# Patient Record
Sex: FEMALE | Race: WHITE
[De-identification: names, ages, dates, MRNs, and addresses within clinical notes are randomized per-mention and may not be internally consistent; named-entity substitution may affect disease eponyms.]

---
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
## 2021-11-01 ENCOUNTER — HOSPITAL ENCOUNTER (EMERGENCY)
Dept: HOSPITAL 75 - ER | Age: 21
LOS: 1 days | Discharge: HOME | End: 2021-11-02
Payer: SELF-PAY

## 2021-11-01 VITALS — HEIGHT: 62.99 IN | WEIGHT: 141.98 LBS | BODY MASS INDEX: 25.16 KG/M2
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
## 2021-11-01 DIAGNOSIS — S83.91XA: Primary | ICD-10-CM
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
## 2021-11-01 PROCEDURE — 73564 X-RAY EXAM KNEE 4 OR MORE: CPT

## 2021-11-02 VITALS — DIASTOLIC BLOOD PRESSURE: 76 MMHG | SYSTOLIC BLOOD PRESSURE: 132 MMHG
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
## 2021-11-02 NOTE — DIAGNOSTIC IMAGING REPORT
KNEE, RIGHT, 4 VIEWS OR >



COMPARISON: None available. 



INDICATION: Right knee pain



TECHNIQUE: Non-weight bearing AP, oblique, sunrise and lateral

views of the right knee knee. 



FINDINGS:

No fracture or traumatic malalignment.  The joint spaces are well

maintained.  No knee joint effusion.



IMPRESSION: Negative right knee radiographs.



Dictated by: 



  Dictated on workstation # [id number]

## 2021-11-25 ENCOUNTER — HOSPITAL ENCOUNTER (EMERGENCY)
Dept: HOSPITAL 75 - ER | Age: 21
Discharge: HOME | End: 2021-11-25
Payer: COMMERCIAL

## 2021-11-25 VITALS — SYSTOLIC BLOOD PRESSURE: 108 MMHG | DIASTOLIC BLOOD PRESSURE: 69 MMHG

## 2021-11-25 VITALS — WEIGHT: 141.98 LBS | HEIGHT: 62.99 IN | BODY MASS INDEX: 25.16 KG/M2

## 2021-11-25 DIAGNOSIS — M25.561: ICD-10-CM

## 2021-11-25 DIAGNOSIS — M25.551: ICD-10-CM

## 2021-11-25 DIAGNOSIS — M25.562: Primary | ICD-10-CM

## 2021-11-25 LAB
ALBUMIN SERPL-MCNC: 4 GM/DL (ref 3.2–4.5)
ALP SERPL-CCNC: 33 U/L (ref 40–136)
ALT SERPL-CCNC: 10 U/L (ref 0–55)
APTT BLD: 29 SEC (ref 24–35)
APTT PPP: YELLOW S
BACTERIA #/AREA URNS HPF: NEGATIVE /HPF
BARBITURATES UR QL: NEGATIVE
BASOPHILS # BLD AUTO: 0 10^3/UL (ref 0–0.1)
BASOPHILS NFR BLD AUTO: 0 % (ref 0–10)
BENZODIAZ UR QL SCN: NEGATIVE
BILIRUB SERPL-MCNC: 0.4 MG/DL (ref 0.1–1)
BILIRUB UR QL STRIP: NEGATIVE
BUN/CREAT SERPL: 16
CALCIUM SERPL-MCNC: 8.8 MG/DL (ref 8.5–10.1)
CHLORIDE SERPL-SCNC: 106 MMOL/L (ref 98–107)
CO2 SERPL-SCNC: 20 MMOL/L (ref 21–32)
COCAINE UR QL: NEGATIVE
CREAT SERPL-MCNC: 0.7 MG/DL (ref 0.6–1.3)
EOSINOPHIL # BLD AUTO: 0.2 10^3/UL (ref 0–0.3)
EOSINOPHIL NFR BLD AUTO: 2 % (ref 0–10)
FIBRINOGEN PPP-MCNC: CLEAR MG/DL
GFR SERPLBLD BASED ON 1.73 SQ M-ARVRAT: 107 ML/MIN
GLUCOSE SERPL-MCNC: 127 MG/DL (ref 70–105)
GLUCOSE UR STRIP-MCNC: NEGATIVE MG/DL
HCT VFR BLD CALC: 38 % (ref 35–52)
HGB BLD-MCNC: 12.6 G/DL (ref 11.5–16)
INR PPP: 0.9 (ref 0.8–1.4)
KETONES UR QL STRIP: NEGATIVE
LEUKOCYTE ESTERASE UR QL STRIP: NEGATIVE
LYMPHOCYTES # BLD AUTO: 1.9 10^3/UL (ref 1–4)
LYMPHOCYTES NFR BLD AUTO: 26 % (ref 12–44)
MANUAL DIFFERENTIAL PERFORMED BLD QL: NO
MCH RBC QN AUTO: 29 PG (ref 25–34)
MCHC RBC AUTO-ENTMCNC: 33 G/DL (ref 32–36)
MCV RBC AUTO: 87 FL (ref 80–99)
METHADONE UR QL SCN: NEGATIVE
METHAMPHETAMINE SCREEN URINE S: NEGATIVE
MONOCYTES # BLD AUTO: 0.4 10^3/UL (ref 0–1)
MONOCYTES NFR BLD AUTO: 6 % (ref 0–12)
NEUTROPHILS # BLD AUTO: 4.9 10^3/UL (ref 1.8–7.8)
NEUTROPHILS NFR BLD AUTO: 66 % (ref 42–75)
NITRITE UR QL STRIP: NEGATIVE
OPIATES UR QL SCN: NEGATIVE
OXYCODONE UR QL: NEGATIVE
PH UR STRIP: 6.5 [PH] (ref 5–9)
PLATELET # BLD: 266 10^3/UL (ref 130–400)
PMV BLD AUTO: 8.9 FL (ref 9–12.2)
POTASSIUM SERPL-SCNC: 3.7 MMOL/L (ref 3.6–5)
PROPOXYPH UR QL: NEGATIVE
PROT SERPL-MCNC: 7 GM/DL (ref 6.4–8.2)
PROT UR QL STRIP: NEGATIVE
PROTHROMBIN TIME: 12.3 SEC (ref 12.2–14.7)
RBC #/AREA URNS HPF: (no result) /HPF
SODIUM SERPL-SCNC: 138 MMOL/L (ref 135–145)
SP GR UR STRIP: <=1.005 (ref 1.02–1.02)
SQUAMOUS #/AREA URNS HPF: (no result) /HPF
TRICYCLICS UR QL SCN: NEGATIVE
WBC # BLD AUTO: 7.4 10^3/UL (ref 4.3–11)
WBC #/AREA URNS HPF: (no result) /HPF

## 2021-11-25 PROCEDURE — 71260 CT THORAX DX C+: CPT

## 2021-11-25 PROCEDURE — 70450 CT HEAD/BRAIN W/O DYE: CPT

## 2021-11-25 PROCEDURE — 99284 EMERGENCY DEPT VISIT MOD MDM: CPT

## 2021-11-25 PROCEDURE — 80306 DRUG TEST PRSMV INSTRMNT: CPT

## 2021-11-25 PROCEDURE — 74177 CT ABD & PELVIS W/CONTRAST: CPT

## 2021-11-25 PROCEDURE — 85025 COMPLETE CBC W/AUTO DIFF WBC: CPT

## 2021-11-25 PROCEDURE — 36415 COLL VENOUS BLD VENIPUNCTURE: CPT

## 2021-11-25 PROCEDURE — 81000 URINALYSIS NONAUTO W/SCOPE: CPT

## 2021-11-25 PROCEDURE — 93041 RHYTHM ECG TRACING: CPT

## 2021-11-25 PROCEDURE — 80320 DRUG SCREEN QUANTALCOHOLS: CPT

## 2021-11-25 PROCEDURE — 73562 X-RAY EXAM OF KNEE 3: CPT

## 2021-11-25 PROCEDURE — 72170 X-RAY EXAM OF PELVIS: CPT

## 2021-11-25 PROCEDURE — 71045 X-RAY EXAM CHEST 1 VIEW: CPT

## 2021-11-25 PROCEDURE — 72125 CT NECK SPINE W/O DYE: CPT

## 2021-11-25 PROCEDURE — 73552 X-RAY EXAM OF FEMUR 2/>: CPT

## 2021-11-25 PROCEDURE — 85730 THROMBOPLASTIN TIME PARTIAL: CPT

## 2021-11-25 PROCEDURE — 85610 PROTHROMBIN TIME: CPT

## 2021-11-25 PROCEDURE — 80053 COMPREHEN METABOLIC PANEL: CPT

## 2021-11-25 PROCEDURE — 84703 CHORIONIC GONADOTROPIN ASSAY: CPT
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
## 2023-01-21 ENCOUNTER — HOSPITAL ENCOUNTER (OUTPATIENT)
Dept: HOSPITAL 75 - ER | Age: 23
Setting detail: OBSERVATION
LOS: 1 days | Discharge: HOME | End: 2023-01-22
Attending: OBSTETRICS & GYNECOLOGY | Admitting: OBSTETRICS & GYNECOLOGY
Payer: SELF-PAY

## 2023-01-21 DIAGNOSIS — O04.6: Primary | ICD-10-CM

## 2023-01-21 LAB
ALBUMIN SERPL-MCNC: 3.6 GM/DL (ref 3.2–4.5)
ALP SERPL-CCNC: 35 U/L (ref 40–136)
ALT SERPL-CCNC: 20 U/L (ref 0–55)
APTT BLD: 27 SEC (ref 24–35)
BASOPHILS # BLD AUTO: 0 10^3/UL (ref 0–0.1)
BASOPHILS NFR BLD AUTO: 0 % (ref 0–10)
BILIRUB SERPL-MCNC: 0.2 MG/DL (ref 0.1–1)
BUN/CREAT SERPL: 10
CALCIUM SERPL-MCNC: 8.3 MG/DL (ref 8.5–10.1)
CHLORIDE SERPL-SCNC: 105 MMOL/L (ref 98–107)
CO2 SERPL-SCNC: 20 MMOL/L (ref 21–32)
CREAT SERPL-MCNC: 0.69 MG/DL (ref 0.6–1.3)
EOSINOPHIL # BLD AUTO: 0.2 10^3/UL (ref 0–0.3)
EOSINOPHIL NFR BLD AUTO: 1 % (ref 0–10)
GFR SERPLBLD BASED ON 1.73 SQ M-ARVRAT: 127 ML/MIN
GLUCOSE SERPL-MCNC: 124 MG/DL (ref 70–105)
HCT VFR BLD CALC: 35 % (ref 35–52)
HGB BLD-MCNC: 11.9 G/DL (ref 11.5–16)
INR PPP: 0.9 (ref 0.8–1.4)
LYMPHOCYTES # BLD AUTO: 1.8 10^3/UL (ref 1–4)
LYMPHOCYTES NFR BLD AUTO: 14 % (ref 12–44)
MANUAL DIFFERENTIAL PERFORMED BLD QL: NO
MCH RBC QN AUTO: 29 PG (ref 25–34)
MCHC RBC AUTO-ENTMCNC: 34 G/DL (ref 32–36)
MCV RBC AUTO: 85 FL (ref 80–99)
MONOCYTES # BLD AUTO: 0.5 10^3/UL (ref 0–1)
MONOCYTES NFR BLD AUTO: 4 % (ref 0–12)
NEUTROPHILS # BLD AUTO: 10.3 10^3/UL (ref 1.8–7.8)
NEUTROPHILS NFR BLD AUTO: 81 % (ref 42–75)
PLATELET # BLD: 273 10^3/UL (ref 130–400)
PMV BLD AUTO: 9.3 FL (ref 9–12.2)
POTASSIUM SERPL-SCNC: 3.5 MMOL/L (ref 3.6–5)
PROT SERPL-MCNC: 6.3 GM/DL (ref 6.4–8.2)
PROTHROMBIN TIME: 13 SEC (ref 12.2–14.7)
SODIUM SERPL-SCNC: 134 MMOL/L (ref 135–145)
WBC # BLD AUTO: 12.7 10^3/UL (ref 4.3–11)

## 2023-01-21 PROCEDURE — 99283 EMERGENCY DEPT VISIT LOW MDM: CPT

## 2023-01-21 PROCEDURE — 36415 COLL VENOUS BLD VENIPUNCTURE: CPT

## 2023-01-21 PROCEDURE — 85610 PROTHROMBIN TIME: CPT

## 2023-01-21 PROCEDURE — 86900 BLOOD TYPING SEROLOGIC ABO: CPT

## 2023-01-21 PROCEDURE — 86920 COMPATIBILITY TEST SPIN: CPT

## 2023-01-21 PROCEDURE — 85025 COMPLETE CBC W/AUTO DIFF WBC: CPT

## 2023-01-21 PROCEDURE — 86850 RBC ANTIBODY SCREEN: CPT

## 2023-01-21 PROCEDURE — 85730 THROMBOPLASTIN TIME PARTIAL: CPT

## 2023-01-21 PROCEDURE — 96361 HYDRATE IV INFUSION ADD-ON: CPT

## 2023-01-21 PROCEDURE — 76817 TRANSVAGINAL US OBSTETRIC: CPT

## 2023-01-21 PROCEDURE — 86901 BLOOD TYPING SEROLOGIC RH(D): CPT

## 2023-01-21 PROCEDURE — 84702 CHORIONIC GONADOTROPIN TEST: CPT

## 2023-01-21 PROCEDURE — 96375 TX/PRO/DX INJ NEW DRUG ADDON: CPT

## 2023-01-21 PROCEDURE — 80053 COMPREHEN METABOLIC PANEL: CPT

## 2023-01-22 VITALS — DIASTOLIC BLOOD PRESSURE: 66 MMHG | SYSTOLIC BLOOD PRESSURE: 94 MMHG

## 2023-01-22 VITALS — DIASTOLIC BLOOD PRESSURE: 56 MMHG | SYSTOLIC BLOOD PRESSURE: 91 MMHG

## 2023-01-22 VITALS — DIASTOLIC BLOOD PRESSURE: 56 MMHG | SYSTOLIC BLOOD PRESSURE: 104 MMHG

## 2023-01-22 VITALS — SYSTOLIC BLOOD PRESSURE: 90 MMHG | DIASTOLIC BLOOD PRESSURE: 57 MMHG

## 2023-01-22 VITALS — SYSTOLIC BLOOD PRESSURE: 90 MMHG | DIASTOLIC BLOOD PRESSURE: 58 MMHG

## 2023-01-22 VITALS — DIASTOLIC BLOOD PRESSURE: 54 MMHG | SYSTOLIC BLOOD PRESSURE: 93 MMHG

## 2023-01-22 VITALS — DIASTOLIC BLOOD PRESSURE: 52 MMHG | SYSTOLIC BLOOD PRESSURE: 94 MMHG

## 2023-01-22 VITALS — SYSTOLIC BLOOD PRESSURE: 96 MMHG | DIASTOLIC BLOOD PRESSURE: 44 MMHG

## 2023-01-22 VITALS — DIASTOLIC BLOOD PRESSURE: 57 MMHG | SYSTOLIC BLOOD PRESSURE: 103 MMHG

## 2023-01-22 VITALS — SYSTOLIC BLOOD PRESSURE: 98 MMHG | DIASTOLIC BLOOD PRESSURE: 81 MMHG

## 2023-01-22 VITALS — SYSTOLIC BLOOD PRESSURE: 96 MMHG | DIASTOLIC BLOOD PRESSURE: 55 MMHG

## 2023-01-22 VITALS — SYSTOLIC BLOOD PRESSURE: 91 MMHG | DIASTOLIC BLOOD PRESSURE: 53 MMHG

## 2023-01-22 LAB
BASOPHILS # BLD AUTO: 0 10^3/UL (ref 0–0.1)
BASOPHILS NFR BLD AUTO: 0 % (ref 0–10)
EOSINOPHIL # BLD AUTO: 0.1 10^3/UL (ref 0–0.3)
EOSINOPHIL NFR BLD AUTO: 1 % (ref 0–10)
HCT VFR BLD CALC: 32 % (ref 35–52)
HGB BLD-MCNC: 11 G/DL (ref 11.5–16)
LYMPHOCYTES # BLD AUTO: 1.9 10^3/UL (ref 1–4)
LYMPHOCYTES NFR BLD AUTO: 17 % (ref 12–44)
MANUAL DIFFERENTIAL PERFORMED BLD QL: NO
MCH RBC QN AUTO: 29 PG (ref 25–34)
MCHC RBC AUTO-ENTMCNC: 34 G/DL (ref 32–36)
MCV RBC AUTO: 84 FL (ref 80–99)
MONOCYTES # BLD AUTO: 0.5 10^3/UL (ref 0–1)
MONOCYTES NFR BLD AUTO: 5 % (ref 0–12)
NEUTROPHILS # BLD AUTO: 8.6 10^3/UL (ref 1.8–7.8)
NEUTROPHILS NFR BLD AUTO: 77 % (ref 42–75)
PLATELET # BLD: 215 10^3/UL (ref 130–400)
PMV BLD AUTO: 9.5 FL (ref 9–12.2)
WBC # BLD AUTO: 11.1 10^3/UL (ref 4.3–11)

## 2023-01-22 NOTE — HISTORY & PHYSICAL
History and Physical


Date Seen by Provider:  2023


Time Seen by Provider:  10:58


This patient is a 22-year-old  1 female who was admitted via the 

emergency department last evening for vaginal hemorrhage due to induced 

miscarriage.  Patient reports that she conceived while using the NuvaRing.  The 

assumption was that she was around 8 weeks pregnant.  Apparently there was no 

confirmation of that fact.  Patient presented to the emergency department with 

low blood pressure and apparently there was concern for hypovolemic shock.  I 

was consulted by phone by Dr. Baum for recommendations for management of her 

bleeding.  Report to me was that her blood pressure initially was undetectable. 

However since patient was apparently recovering in the short period of time 

between presentation and this phone call from the emergency department which Dr. Baum relayed to me was about 17 minutes.  He reported that she was bleeding 

fairly briskly however no lab work was available, no ultrasound was available, 

no pelvic exam had been performed, I was unable to offer any specific management

recommendation for her bleeding other than obtain ultrasound to evaluate the 

status and condition of the uterus/pregnancy and of course routine lab work for 

baseline hemoglobin.  At that time Dr. Baum indicated that he would obtain 

those studies and then call me back.





After several hours I did receive another call from the emergency department 

patient at that point was bleeding apparently like a heavy period.  She had been

given or was given a single unit of packed red cells.  Her initial quantitative 

hCG was 170,000.  Her initial hemoglobin was 11.6.  As she was hemodynamically 

stable by this point plan was for admission and observation.





This morning the patient reports having bleeding like a period.  She no longer 

complaining of cramps pain or pressure.  She is lucid.  She has hungry and has 

been allowed a diet as she has had no significant bleeding since admission to [facility].  She is requesting discharge home.





Patient reports no allergies





Patient reports no medications other than NuvaRing that she was using at the 

time of conception


Patient did not obtain  inducing drugs by some means.  She had taken 

those medications to induce the miscarriage/.





Surgical history is none





Family history is noncontributory





Social history patient denies drugs tobacco or alcohol use





HEENT exam is normal


Neck is supple no lymphadenopathy no thyromegaly


Abdomen soft nontender nondistended


Extremities show no clubbing or cyanosis.  There is no Homans' sign.





Pelvic exam is deferred





Lab work is as follows


Laboratory Tests








Test


 23


23:00 23


05:24 Range/Units


 


 


White Blood Count


 12.7 H


 11.1 H


 4.3-11.0


10^3/uL


 


Red Blood Count


 4.09 


 3.82 


 3.80-5.11


10^6/uL


 


Hemoglobin 11.9  11.0 L 11.5-16.0  g/dL


 


Hematocrit 35  32 L 35-52  %


 


Mean Corpuscular Volume 85  84  80-99  fL


 


Mean Corpuscular Hemoglobin 29  29  25-34  pg


 


Mean Corpuscular Hemoglobin


Concent 34 


 34 


 32-36  g/dL





 


Red Cell Distribution Width 12.4  13.2  10.0-14.5  %


 


Platelet Count


 273 


 215 


 130-400


10^3/uL


 


Mean Platelet Volume 9.3  9.5  9.0-12.2  fL


 


Immature Granulocyte % (Auto) 0  0   %


 


Neutrophils (%) (Auto) 81 H 77 H 42-75  %


 


Lymphocytes (%) (Auto) 14  17  12-44  %


 


Monocytes (%) (Auto) 4  5  0-12  %


 


Eosinophils (%) (Auto) 1  1  0-10  %


 


Basophils (%) (Auto) 0  0  0-10  %


 


Neutrophils # (Auto)


 10.3 H


 8.6 H


 1.8-7.8


10^3/uL


 


Lymphocytes # (Auto)


 1.8 


 1.9 


 1.0-4.0


10^3/uL


 


Monocytes # (Auto)


 0.5 


 0.5 


 0.0-1.0


10^3/uL


 


Eosinophils # (Auto)


 0.2 


 0.1 


 0.0-0.3


10^3/uL


 


Basophils # (Auto)


 0.0 


 0.0 


 0.0-0.1


10^3/uL


 


Immature Granulocyte # (Auto)


 0.1 


 0.0 


 0.0-0.1


10^3/uL


 


Prothrombin Time 13.0   12.2-14.7  SEC


 


INR Comment 0.9   0.8-1.4  


 


Activated Partial


Thromboplast Time 27 


 


 24-35  SEC





 


Sodium Level 134 L  135-145  MMOL/L


 


Potassium Level 3.5 L  3.6-5.0  MMOL/L


 


Chloride Level 105     MMOL/L


 


Carbon Dioxide Level 20 L  21-32  MMOL/L


 


Anion Gap 9   5-14  MMOL/L


 


Blood Urea Nitrogen 7   7-18  MG/DL


 


Creatinine


 0.69 


 


 0.60-1.30


MG/DL


 


Estimat Glomerular Filtration


Rate 127 


 


  





 


BUN/Creatinine Ratio 10    


 


Glucose Level 124 H    MG/DL


 


Calcium Level 8.3 L  8.5-10.1  MG/DL


 


Corrected Calcium 8.6   8.5-10.1  MG/DL


 


Total Bilirubin 0.2   0.1-1.0  MG/DL


 


Aspartate Amino Transf


(AST/SGOT) 18 


 


 5-34  U/L





 


Alanine Aminotransferase


(ALT/SGPT) 20 


 


 0-55  U/L





 


Alkaline Phosphatase 35 L    U/L


 


Total Protein 6.3 L  6.4-8.2  GM/DL


 


Albumin 3.6   3.2-4.5  GM/DL


 


Human Chorionic Gonadotropin,


Quant 773329 H


 12079 H


 <5  MIU/ML




















Vital Signs








  Date Time  Temp Pulse Resp B/P (MAP) Pulse Ox O2 Delivery O2 Flow Rate FiO2


 


23 08:25 36.3 78 16 90/58 (69) 99 Room Air  


 


23 06:00  75 16 91/53 (66) 100 Room Air  


 


23 05:00  75 16 94/52 (66) 100 Room Air  


 


23 04:30  69 16 104/56 (72) 100 Room Air  


 


23 04:00  77 16 93/54 (67) 100 Room Air  


 


23 03:30  80 16 96/55 (69) 100 Room Air  


 


23 03:15  77 16 90/57 (68) 100 Room Air  


 


23 03:04  78 16 96/44 (61) 100 Room Air  


 


23 02:50 37.2 72 16 103/57 (72) 100 Room Air  


 


23 02:35 36.0 77 16 94/66  Room Air  


 


23 02:11 36.0 82 16 98/81 100 Room Air  


 


23 02:00 36.0 76 16 98/81 100 Room Air  


 


23 01:40 35.6 89  91/56 99 Room Air  


 


23 22:42 37.0 88 18 99/83 (88) 97 Room Air  














I & O 


 


 23





 07:00


 


Intake Total 200 ml


 


Balance 200 ml





Vital signs are noted.  Significantly patient does have a relatively low blood 

pressure but she is a young healthy woman.  Quite notably she does not have an 

elevated pulse





Assessment and plan


Patient is status post medically induced .  She did experience 

apparently fairly brisk bleeding but now that has resolved.  Ultrasound obtained

last evening showed an empty uterus with a 2 cm endometrial stripe which would 

be appropriate.  Patient understands that eventually she will have a period and 

that tissue will slough and likely result in a heavy menstrual period.  She 

should expect that to occur in the next couple of weeks.





Patient is hemodynamically stable she is no longer having significant bleeding 

plan is for discharge home with follow-up in clinic


Medically induced  with hemorrhage





Allergies and Home Medications


Allergies


Coded Allergies:  


     No Known Drug Allergies (Unverified , 09)





Patient Home Medication List


Home Medication List Reviewed:  Yes


Amoxicillin (Amoxil) 400 Mg/5 Ml Susp.recon, 6.5 ML PO BID


   Prescribed by: JOHANNA GONZALES on 09 112


Cyclobenzaprine HCl (Cyclobenzaprine HCl) 10 Mg Tablet, 10 MG PO Q8H PRN for 

SPASMS


   Prescribed by: TC FENTON on 21


Methocarbamol (Robaxin-750) 750 Mg Tablet, 750 MG PO Q4H PRN for PAIN-MODERATE 

(5-7)


   Prescribed by: KIEL GUADARRAMA on 20


Naproxen (Naproxen) 500 Mg Tablet.dr, 500 MG PO BID


   Prescribed by: TC FENTON on 21


Naproxen (Naproxen) 500 Mg Tablet.dr, 500 MG PO BID


   Prescribed by: TC FENTON on 21


Ondansetron (Ondansetron Odt) 4 Mg Tab.rapdis, 4 MG PO Q6H PRN for 

NAUSEA/VOMITING


   Prescribed by: JOSUE ORTIZ on 20


Tramadol HCl (Ultram) 50 Mg Tablet, 50 MG PO Q4H


   Prescribed by: TC FENTON on 21











KRYSTINA OLIVER MD       2023 11:05

## 2023-01-22 NOTE — ED GU-FEMALE
General


Chief Complaint:  OB < 20 WEEKS


Stated Complaint:  VAG BLEEDING


Nursing Triage Note:  


TO ED VIA [facility] TO ROOM 10 WITH C/O EXCESSIVE VAGINAL BLEEDING AFTER 


TAKNG MISOPROSTOL 200MCG TO INDUCE  AT 2100. PT STATES SHE IS APPROX 8 


WEEKS PREGNANT. C/O ABD CRAMPING. 22G IV STARTED TO RIGHT FOREARM BY EMS WITH NS




INFUSING.


Source:  patient


Exam Limitations:  no limitations





History of Present Illness


Date Seen by Provider:  2023


Time Seen by Provider:  22:39


Initial Comments


This 22-year-old young lady presents to the emergency room via [facility] 

EMS with abdominal cramping and vaginal hemorrhaging after taking the first 3 

doses of the two-step chemical .  She took mifepristone at approximately

0100 on  and took two tablets of misoprostol within 1 hour of arrival 

to the ER.  She developed severe abdominal cramping and vaginal hemorrhaging 

about 30 minutes after taking the misoprostol.  EMS describes a very large 

amount of bleeding at scene which is now soaking through their blankets on the 

cot.  Patient feels lightheaded and weak.  She is pale in appearance.  Initial 

systolic blood pressure for EMS was unmeasurable.  A second systolic blood press

ure was 90.  IV fluids were initiated in route to the ER.  Patient denies any 

significant past medical history.  She was reportedly at approximately 8 weeks 

gestational age.  The chemical  was [place] by an [place] 

provider through videoconferencing.  No in person exam was performed and no 

ultrasound confirming gestational age or intrauterine pregnancy was performed.





Allergies and Home Medications


Allergies


Coded Allergies:  


     No Known Drug Allergies (Unverified , 09)





Patient Home Medication List


Home Medication List Reviewed:  Yes


Amoxicillin (Amoxil) 400 Mg/5 Ml Susp.recon, 6.5 ML PO BID


   Prescribed by: JOHANNA GONZALES on 09 1125


Cyclobenzaprine HCl (Cyclobenzaprine HCl) 10 Mg Tablet, 10 MG PO Q8H PRN for 

SPASMS


   Prescribed by: TC FENTON on 21 0349


Methocarbamol (Robaxin-750) 750 Mg Tablet, 750 MG PO Q4H PRN for PAIN-MODERATE 

(5-7)


   Prescribed by: KIEL GUADARRAMA on 20


Naproxen (Naproxen) 500 Mg Tablet.dr, 500 MG PO BID


   Prescribed by: TC FENTON on 21


Naproxen (Naproxen) 500 Mg Tablet.dr, 500 MG PO BID


   Prescribed by: TC FENTON on 21 034


Ondansetron (Ondansetron Odt) 4 Mg Tab.rapdis, 4 MG PO Q6H PRN for 

NAUSEA/VOMITING


   Prescribed by: JOSUE ORTIZ on 20


Tramadol HCl (Ultram) 50 Mg Tablet, 50 MG PO Q4H


   Prescribed by: TC FENTON on 21





Review of Systems


Review of Systems


Constitutional:  see HPI


EENTM:  no symptoms reported


Respiratory:  no symptoms reported


Cardiovascular:  see HPI


Gastrointestinal:  see HPI


Genitourinary:  see HPI


Pregnant:  Yes


Musculoskeletal:  no symptoms reported


Skin:  see HPI


Psychiatric/Neurological:  No Symptoms Reported


Endocrine:  No Symptoms Reported


Hematologic/Lymphatic:  No Symptoms Reported





Past Medical-Social-Family Hx


Patient Social History


Tobacco Use?:  No


Substance use?:  No


Alcohol Use?:  No





Immunizations Up To Date


Tetanus Booster (TDap):  Less than 5yrs


Influenza Vaccine Up-to-Date:  No; Not Current


First/Initial COVID19 Vaccinat:  "A COUPLE MONTHS AGO"


Second COVID19 Vaccination Aravind:  "A COUPLE MONTHS AGO"


COVID19 Vaccine :  MODERNA





Seasonal Allergies


Seasonal Allergies:  No





Past Medical History


Surgery/Hospitalization HX:  


RIGHT KNEE DISLOCATION, DEPRESSION


Surgeries:  No


Respiratory:  No


Cardiac:  No


Neurological:  No


Pregnant:  Yes


Reproductive Disorders:  No


Sexually Transmitted Disease:  No


Gastrointestinal:  No


Musculoskeletal:  No


Endocrine:  No


Cancer:  No


Psychosocial:  Yes


Depression


Integumentary:  No


Blood Disorders:  No


Adverse Reaction/Blood Tranf:  No





Physical Exam


Vital Signs





Vital Signs - First Documented








 23





 22:42


 


Temp 37.0


 


Pulse 88


 


Resp 18


 


B/P (MAP) 99/83 (88)


 


Pulse Ox 97


 


O2 Delivery Room Air





Capillary Refill : Less Than 3 Seconds


Height, Weight, BMI


Height: 5'4.00"


Weight: 145lbs. oz. [id number]; 25.00 BMI


Method:Stated


General Appearance:  WD/WN, moderate distress


HEENT:  normal ENT inspection


Neck:  normal inspection


Cardiovascular:  regular rate, rhythm, no edema, no murmur


Respiratory:  lungs clear, normal breath sounds, no respiratory distress


Gastrointestinal:  normal bowel sounds, soft, tenderness (minimal lower 

abdominal tenderness)


Genital/Rectal:  other (heavy vaginal bleeding)


Extremities:  normal inspection, no pedal edema


Neurologic/Psychiatric:  no motor/sensory deficits, alert, oriented x 3, other 

(anxious and distressed)


Skin:  normal color, warm/dry





Progress/Results/Core Measures


Suspected Sepsis


SIRS


Temperature: 


Pulse: 88 


Respiratory Rate: 18


 


Laboratory Tests


23 23:00: White Blood Count 12.7H


23 05:24: White Blood Count 11.1H


Blood Pressure 99 /83 


Mean: 88


 


Laboratory Tests


23 23:00: 


Creatinine 0.69, INR Comment 0.9, Platelet Count 273, Total Bilirubin 0.2


23 05:24: Platelet Count 215








Results/Orders


Lab Results





Laboratory Tests








Test


 23


23:00 23


05:24 Range/Units


 


 


White Blood Count


 12.7 H


 11.1 H


 4.3-11.0


10^3/uL


 


Red Blood Count


 4.09 


 3.82 


 3.80-5.11


10^6/uL


 


Hemoglobin 11.9  11.0 L 11.5-16.0  g/dL


 


Hematocrit 35  32 L 35-52  %


 


Mean Corpuscular Volume 85  84  80-99  fL


 


Mean Corpuscular Hemoglobin 29  29  25-34  pg


 


Mean Corpuscular Hemoglobin


Concent 34 


 34 


 32-36  g/dL





 


Red Cell Distribution Width 12.4  13.2  10.0-14.5  %


 


Platelet Count


 273 


 215 


 130-400


10^3/uL


 


Mean Platelet Volume 9.3  9.5  9.0-12.2  fL


 


Immature Granulocyte % (Auto) 0  0   %


 


Neutrophils (%) (Auto) 81 H 77 H 42-75  %


 


Lymphocytes (%) (Auto) 14  17  12-44  %


 


Monocytes (%) (Auto) 4  5  0-12  %


 


Eosinophils (%) (Auto) 1  1  0-10  %


 


Basophils (%) (Auto) 0  0  0-10  %


 


Neutrophils # (Auto)


 10.3 H


 8.6 H


 1.8-7.8


10^3/uL


 


Lymphocytes # (Auto)


 1.8 


 1.9 


 1.0-4.0


10^3/uL


 


Monocytes # (Auto)


 0.5 


 0.5 


 0.0-1.0


10^3/uL


 


Eosinophils # (Auto)


 0.2 


 0.1 


 0.0-0.3


10^3/uL


 


Basophils # (Auto)


 0.0 


 0.0 


 0.0-0.1


10^3/uL


 


Immature Granulocyte # (Auto)


 0.1 


 0.0 


 0.0-0.1


10^3/uL


 


Prothrombin Time 13.0   12.2-14.7  SEC


 


INR Comment 0.9   0.8-1.4  


 


Activated Partial


Thromboplast Time 27 


 


 24-35  SEC





 


Sodium Level 134 L  135-145  MMOL/L


 


Potassium Level 3.5 L  3.6-5.0  MMOL/L


 


Chloride Level 105     MMOL/L


 


Carbon Dioxide Level 20 L  21-32  MMOL/L


 


Anion Gap 9   5-14  MMOL/L


 


Blood Urea Nitrogen 7   7-18  MG/DL


 


Creatinine


 0.69 


 


 0.60-1.30


MG/DL


 


Estimat Glomerular Filtration


Rate 127 


 


  





 


BUN/Creatinine Ratio 10    


 


Glucose Level 124 H    MG/DL


 


Calcium Level 8.3 L  8.5-10.1  MG/DL


 


Corrected Calcium 8.6   8.5-10.1  MG/DL


 


Total Bilirubin 0.2   0.1-1.0  MG/DL


 


Aspartate Amino Transf


(AST/SGOT) 18 


 


 5-34  U/L





 


Alanine Aminotransferase


(ALT/SGPT) 20 


 


 0-55  U/L





 


Alkaline Phosphatase 35 L    U/L


 


Total Protein 6.3 L  6.4-8.2  GM/DL


 


Albumin 3.6   3.2-4.5  GM/DL


 


Human Chorionic Gonadotropin,


Quant [id number] H


 71714 H


 <5  MIU/ML











My Orders





Orders - ANAYELI STODDARD MD


 Ob Transvaginal [id number] (23 22:46)


Red Cells Leukocytes Reduced (23 22:48)


Type And Screen (23 22:48)


Cbc With Automated Diff (23 22:49)


Comprehensive Metabolic Panel (23 22:49)


Hcg,Quantitative (23 22:49)


Protime With Inr (23 22:49)


Partial Thromboplastin Time (23 22:49)


Morphine  Injection (Morphine  Injection (23 23:15)


Morphine  Injection (Morphine  Injection (23 00:40)


Ns Iv 500 Ml (Sodium Chloride 0.9%) (23 01:30)





Vital Signs/I&O











 23





 22:42 01:40 02:00 02:11


 


Temp 37.0 35.6 36.0 36.0


 


Pulse 88 89 76 82


 


Resp 18  16 16


 


B/P (MAP) 99/83 (88) 91/56 98/81 98/81


 


Pulse Ox 97 99 100 100


 


O2 Delivery Room Air Room Air Room Air Room Air


 


    





 23





 02:35 02:50 03:04 03:15


 


Temp 36.0 37.2  


 


Pulse 77 72 78 77


 


Resp 16 16 16 16


 


B/P (MAP) 94/66 103/57 (72) 96/44 (61) 90/57 (68)


 


Pulse Ox  100 100 100


 


O2 Delivery Room Air Room Air Room Air Room Air


 


    





 23





 03:30 04:00 04:30 05:00


 


Pulse 80 77 69 75


 


Resp 16 16 16 16


 


B/P (MAP) 96/55 (69) 93/54 (67) 104/56 (72) 94/52 (66)


 


Pulse Ox 100 100 100 100


 


O2 Delivery Room Air Room Air Room Air Room Air





 23   





 06:00   


 


Pulse 75   


 


Resp 16   


 


B/P (MAP) 91/53 (66)   


 


Pulse Ox 100   


 


O2 Delivery Room Air   














 23





 00:00


 


Intake Total 100 ml


 


Balance 100 ml





Capillary Refill : Less Than 3 Seconds








Blood Pressure Mean:                    88








Progress Note :  


   Time:  01:23


Progress Note


Patient received a 1 L normal saline bolus as initiated by EMS.  This did 

resuscitate her blood pressure.  She has had a few scattered systolic blood pr

essures in the 60s to 90s.  Most blood pressures have been normotensive with 

some blood pressures as high as the 110s.  Because of the intermittent 

hypotension despite a liter of IVF, she will receive a unit of PRBC.  A second 

unit is being held.  She is still having some slower bleeding.  Case was 

reviewed with Dr. OLIVER.  Patient will be admitted and observed.  

Ultrasound was discussed with the technician.  Thickened endometrium without 

intrauterine gestation was noted.  No evidence of ectopic pregnancy was seen.  

Radiologist report from [facility] did comment ectopic pregnancy cannot be excluded

in the absence of identifying an intrauterine pregnancy, but the technician did 

not see any evidence of an ectopic pregnancy.  Patient was treated with morphine

2 mg previously.  Repeat dosing is being held until blood pressure improves.  I 

am ordering a repeat hCG and CBC for later in the morning.  Patient is being 

admitted to women services.  Patient's blood type was a positive.





Diagnostic Imaging





   Diagonstic Imaging:  Ultrasound


   Plain Films/CT/US/NM/MRI:  abdomen


Comments


Pelvic ultrasound was obtained.  Results were discussed with the technician and 

the radiologist report was reviewed.  There was thickened endometrium without 

intrauterine gestation.  There is no evidence of ectopic pregnancy.  Radiologist

commented ectopic pregnancy cannot be excluded since an intrauterine pregnancy 

was not visualized.





Departure


Impression





   Primary Impression:  


   Complication following medical 


   Additional Impressions:  


   Vaginal hemorrhage


   Hypotension due to blood loss


Disposition:   ADMITTED AS INPATIENT


Condition:  Improved





Departure-Patient Inst.


Referrals:  


[facility] (PCP/Family)


Primary Care Physician





Copy


Copies To 1:   KRYSTINA OLIVER MD, JOSHUA T MD        2023 01:26

## 2023-01-22 NOTE — DISCHARGE INST-SURGICAL
Discharge Inst-Surgical


Depart Medication/Instructions


New, Converted or Re-Newed RX:  Other





Consults/Follow Up


Orders & Referrals


Return to clinic as scheduled





Use Motrin as prescribed





Return to emergency department for significant bleeding or other concerns





Activity


Activity as Tolerated:  No





Diet


Discharge Diet:  No Restrictions











KRYSTINA OLIVER MD       Jan 22, 2023 11:08

## 2023-01-22 NOTE — DIAGNOSTIC IMAGING REPORT
PROCEDURE:  Pelvic complete, transabdominal and transvaginal

sonogram. Limited pelvic doppler.



TECHNIQUE: Multiple real-time grayscale images were obtained of

the pelvis in various projections transabdominally and

transvaginally. Limited pelvic duplex images were obtained.



HISTORY: Reported history of pregnancy with vaginal bleeding.



COMPARISON: None available.



FINDINGS: 



Uterus: The uterus is anteverted and measures 8.6 x 4.4 x 5.4 cm.

The myometrium is homogeneous without fibroids.



Endometrium: The endometrium is increased in thickness and

measures 2.0 cm. The endometrium is heterogeneous without fluid

or visualized gestational sac.



Adnexa: Both ovaries have a normal physiologic appearance. The

right ovary measures 2.7 x 1.2 x 1.1 cm and the left ovary

measures 2.2 x 1.2 x 2.1 cm. Duplex images reveal normal vascular

flow to both ovaries. No adnexal mass.



Other: There is no free fluid within the pelvis.



IMPRESSION:



1. No visualized intrauterine gestational sac. Findings can be

seen with early, failed, or nonvisualized ectopic pregnancy.

Recommend close clinical follow-up with beta hCG and ultrasound

as indicated.

2. Agree with preliminary interpretation.



Dictated by: 



  Dictated on workstation # [id number]